# Patient Record
Sex: MALE | Race: WHITE
[De-identification: names, ages, dates, MRNs, and addresses within clinical notes are randomized per-mention and may not be internally consistent; named-entity substitution may affect disease eponyms.]

---

## 2017-11-20 ENCOUNTER — HOSPITAL ENCOUNTER (INPATIENT)
Dept: HOSPITAL 54 - ER | Age: 22
LOS: 1 days | Discharge: HOME | DRG: 247 | End: 2017-11-21
Attending: INTERNAL MEDICINE | Admitting: INTERNAL MEDICINE
Payer: MEDICAID

## 2017-11-20 VITALS — HEIGHT: 69 IN | BODY MASS INDEX: 25.19 KG/M2 | WEIGHT: 170.04 LBS

## 2017-11-20 DIAGNOSIS — E87.6: ICD-10-CM

## 2017-11-20 DIAGNOSIS — K56.7: Primary | ICD-10-CM

## 2017-11-20 DIAGNOSIS — J06.9: ICD-10-CM

## 2017-11-20 DIAGNOSIS — J45.909: ICD-10-CM

## 2017-11-20 LAB
ALBUMIN SERPL BCP-MCNC: 4.3 G/DL (ref 3.4–5)
ALP SERPL-CCNC: 108 U/L (ref 46–116)
ALT SERPL W P-5'-P-CCNC: 31 U/L (ref 12–78)
APTT PPP: 29 SEC (ref 23–34)
AST SERPL W P-5'-P-CCNC: 24 U/L (ref 15–37)
BASOPHILS # BLD AUTO: 0 /CMM (ref 0–0.2)
BASOPHILS NFR BLD AUTO: 0.3 % (ref 0–2)
BILIRUB DIRECT SERPL-MCNC: 0.2 MG/DL (ref 0–0.2)
BILIRUB SERPL-MCNC: 1 MG/DL (ref 0.2–1)
BUN SERPL-MCNC: 9 MG/DL (ref 7–18)
CALCIUM SERPL-MCNC: 9.6 MG/DL (ref 8.5–10.1)
CHLORIDE SERPL-SCNC: 100 MMOL/L (ref 98–107)
CO2 SERPL-SCNC: 28 MMOL/L (ref 21–32)
CREAT SERPL-MCNC: 1.1 MG/DL (ref 0.6–1.3)
EOSINOPHIL # BLD AUTO: 0.1 /CMM (ref 0–0.7)
EOSINOPHIL NFR BLD AUTO: 1 % (ref 0–6)
GLUCOSE SERPL-MCNC: 118 MG/DL (ref 74–106)
HCT VFR BLD AUTO: 49 % (ref 39–51)
HGB BLD-MCNC: 16.5 G/DL (ref 13.5–17.5)
INR PPP: 0.96 (ref 0.87–1.13)
LIPASE SERPL-CCNC: 74 U/L (ref 73–393)
LYMPHOCYTES NFR BLD AUTO: 1.3 /CMM (ref 0.8–4.8)
LYMPHOCYTES NFR BLD AUTO: 12.7 % (ref 20–44)
MCH RBC QN AUTO: 29 PG (ref 26–33)
MCHC RBC AUTO-ENTMCNC: 33 G/DL (ref 31–36)
MCV RBC AUTO: 88 FL (ref 80–96)
MONOCYTES NFR BLD AUTO: 0.9 /CMM (ref 0.1–1.3)
MONOCYTES NFR BLD AUTO: 8.6 % (ref 2–12)
NEUTROPHILS # BLD AUTO: 7.9 /CMM (ref 1.8–8.9)
NEUTROPHILS NFR BLD AUTO: 77.4 % (ref 43–81)
PLATELET # BLD AUTO: 320 /CMM (ref 150–450)
POTASSIUM SERPL-SCNC: 3.3 MMOL/L (ref 3.5–5.1)
PROT SERPL-MCNC: 8.7 G/DL (ref 6.4–8.2)
PROTHROMBIN TIME: 10 SECS (ref 9.5–12.7)
RBC # BLD AUTO: 5.6 MIL/UL (ref 4.5–6)
RDW COEFFICIENT OF VARIATION: 12.5 (ref 11.5–15)
SODIUM SERPL-SCNC: 137 MMOL/L (ref 136–145)
WBC NRBC COR # BLD AUTO: 10.2 K/UL (ref 4.3–11)

## 2017-11-20 PROCEDURE — Z7610: HCPCS

## 2017-11-20 PROCEDURE — A4606 OXYGEN PROBE USED W OXIMETER: HCPCS

## 2017-11-20 NOTE — NUR
PT A/OX4 BREATHING EFFORTLESSLY ON ROOM AIR, PT C/O LLQ ABD PAIN X 1 DAY WITH 
N/V BUT DENIES DIARRHEA, PT FAMILY AT BEDSIDE, PT ON MONITOR, IV PLACED LABS 
DRAWN, MD MADE AWARE WILL CONTINUE TO MONITOR.

## 2017-11-21 VITALS — SYSTOLIC BLOOD PRESSURE: 119 MMHG | DIASTOLIC BLOOD PRESSURE: 68 MMHG

## 2017-11-21 VITALS — DIASTOLIC BLOOD PRESSURE: 57 MMHG | SYSTOLIC BLOOD PRESSURE: 108 MMHG

## 2017-11-21 VITALS — SYSTOLIC BLOOD PRESSURE: 107 MMHG | DIASTOLIC BLOOD PRESSURE: 57 MMHG

## 2017-11-21 VITALS — DIASTOLIC BLOOD PRESSURE: 62 MMHG | SYSTOLIC BLOOD PRESSURE: 122 MMHG

## 2017-11-21 LAB
APPEARANCE UR: CLEAR
BILIRUB UR QL STRIP: NEGATIVE
BUN SERPL-MCNC: 7 MG/DL (ref 7–18)
CALCIUM SERPL-MCNC: 8.6 MG/DL (ref 8.5–10.1)
CHLORIDE SERPL-SCNC: 105 MMOL/L (ref 98–107)
CO2 SERPL-SCNC: 27 MMOL/L (ref 21–32)
COLOR UR: YELLOW
CREAT SERPL-MCNC: 0.9 MG/DL (ref 0.6–1.3)
GLUCOSE SERPL-MCNC: 89 MG/DL (ref 74–106)
GLUCOSE UR STRIP-MCNC: NEGATIVE MG/DL
HGB UR QL STRIP: (no result) ERY/UL
KETONES UR STRIP-MCNC: (no result) MG/DL
LEUKOCYTE ESTERASE UR QL STRIP: NEGATIVE
NITRITE UR QL STRIP: NEGATIVE
PH UR STRIP: 6 [PH] (ref 5–8)
POTASSIUM SERPL-SCNC: 3.8 MMOL/L (ref 3.5–5.1)
PROT UR QL STRIP: NEGATIVE MG/DL
RBC #/AREA URNS HPF: (no result) /HPF (ref 0–2)
SODIUM SERPL-SCNC: 139 MMOL/L (ref 136–145)
UROBILINOGEN UR STRIP-MCNC: 0.2 EU/DL
WBC #/AREA URNS HPF: (no result) /HPF (ref 0–3)

## 2017-11-21 RX ADMIN — POTASSIUM CHLORIDE SCH MLS/HR: 200 INJECTION, SOLUTION INTRAVENOUS at 04:30

## 2017-11-21 RX ADMIN — POTASSIUM CHLORIDE SCH MLS/HR: 200 INJECTION, SOLUTION INTRAVENOUS at 03:30

## 2017-11-21 RX ADMIN — POTASSIUM CHLORIDE SCH MLS/HR: 200 INJECTION, SOLUTION INTRAVENOUS at 05:30

## 2017-11-21 RX ADMIN — POTASSIUM CHLORIDE SCH MLS/HR: 200 INJECTION, SOLUTION INTRAVENOUS at 06:30

## 2017-11-21 NOTE — NUR
RN NOTES: 



INFORMED DR HARDING THAT KCL IV NOT AVAILABLE IN FLOOR, ER OR NIGHT LOCKER/ SUPERVISOR. AS 
PER DR HARDING, GIVEN 100 MEQS PO KDUR NOW WITH SMALL SIPS OF WATER THEN KEEP PATIENT NPO. 
NOTED AND CARRIED OUT.

## 2017-11-21 NOTE — NUR
M/S RN - Discharge Notes

Patient awake, denies abdominal pain, no c/o n/v, tolerated regular diet well, no apparent 
distress seen, stable for discharge home today. Reviewed discharge instructions with patient 
and he verbalized full understanding of all teachings including follow up care with his PCP 
in 1 week. No prescriptions and no new meds ordered. Heplock removed on the LAC with 
catheter tip intact, no swelling, no redness noted on the area. Skin is intact, refused 
photo to be taken. All belongings given and he denies any missing items. Discharge papers 
given. Accompanied to the lobby and transported by private car.

## 2017-11-21 NOTE — NUR
RN NOTES: 



ADMITTED A 20 YO MALE PATIENT WHO WAS SEEN IN THE ER FOR LEFT SIDED ABDOMINAL PAIN, N/V. 
PATIENT WAS BROUGHT TO [facility] FLOOR VIA W/C, AOX4, ON ROOM AIR, BREATHING EVEN AND UNLABORED, 
BREATH SOUNDS CLEAR TO AUSCULTATION. PATIENT STATES  ABDOMINAL PAIN IS FELT OVER LEFT 
ABDOMEN, NON RADIATING, SCALED AT 5/10 AT THIS TIME. PATIENT STATES LAST VOMITING EPISODE 
WAS BEFORE HE WAS SEEN IN THE ER, AND NOW DENIES NAUSEA. PIV OVER LAC G 18 INTACT AND 
INFUSING WELL WITH NS BOLUS WHICH WAS STARTED IN ER. ADMITTING CARE DONE. PROVIDED FOR 
COMFORT AND SAFETY. ORIENTED TO UNIT. BED IN LOWEST AND LOCKED POSITION, SIDERAILS UPX3, 
WILL CONT TO MONITOR.

## 2017-11-21 NOTE — NUR
MS RN CLOSING NOTES: 

PATIENT IN BED, AOX4, ON ROOM AIR, BREATHING EVEN AND UNLABORED. APPEARS CALM AND IN NO 
DISTRESS, STILL COMPLAINS OF MILD ABDOMINAL PAIN, DENIES ANY NAUSEA. MAINTAINED ON NPO. 
PROVIDED FOR COMFORT AND SAFETY. BED IN LOWEST AND LOCKED POSITION, SIDERAILS UP X2, CALL 
LIGHT WITHIN REACH. WILL ENDORSE TO AM RN FOR RADHA.

## 2019-12-06 ENCOUNTER — HOSPITAL ENCOUNTER (EMERGENCY)
Dept: HOSPITAL 54 - ER | Age: 24
Discharge: HOME | End: 2019-12-06
Payer: COMMERCIAL

## 2019-12-06 VITALS — WEIGHT: 170 LBS | BODY MASS INDEX: 25.18 KG/M2 | HEIGHT: 69 IN

## 2019-12-06 VITALS — DIASTOLIC BLOOD PRESSURE: 82 MMHG | SYSTOLIC BLOOD PRESSURE: 122 MMHG

## 2019-12-06 DIAGNOSIS — J02.9: Primary | ICD-10-CM

## 2019-12-06 DIAGNOSIS — J45.909: ICD-10-CM

## 2019-12-06 PROCEDURE — 99283 EMERGENCY DEPT VISIT LOW MDM: CPT

## 2019-12-06 PROCEDURE — 96372 THER/PROPH/DIAG INJ SC/IM: CPT
